# Patient Record
Sex: FEMALE | Race: WHITE
[De-identification: names, ages, dates, MRNs, and addresses within clinical notes are randomized per-mention and may not be internally consistent; named-entity substitution may affect disease eponyms.]

---

## 2018-10-16 ENCOUNTER — HOSPITAL ENCOUNTER (OUTPATIENT)
Dept: HOSPITAL 61 - PCVCIMAG | Age: 68
Discharge: HOME | End: 2018-10-16
Payer: MEDICARE

## 2018-10-16 ENCOUNTER — HOSPITAL ENCOUNTER (INPATIENT)
Dept: HOSPITAL 35 - 2N | Age: 68
LOS: 2 days | Discharge: HOME | DRG: 287 | End: 2018-10-18
Attending: INTERNAL MEDICINE | Admitting: INTERNAL MEDICINE
Payer: COMMERCIAL

## 2018-10-16 VITALS — DIASTOLIC BLOOD PRESSURE: 107 MMHG | SYSTOLIC BLOOD PRESSURE: 108 MMHG

## 2018-10-16 VITALS — DIASTOLIC BLOOD PRESSURE: 89 MMHG | SYSTOLIC BLOOD PRESSURE: 152 MMHG

## 2018-10-16 DIAGNOSIS — Z91.018: ICD-10-CM

## 2018-10-16 DIAGNOSIS — Z79.899: ICD-10-CM

## 2018-10-16 DIAGNOSIS — Z88.8: ICD-10-CM

## 2018-10-16 DIAGNOSIS — E78.5: ICD-10-CM

## 2018-10-16 DIAGNOSIS — I10: ICD-10-CM

## 2018-10-16 DIAGNOSIS — Z82.49: ICD-10-CM

## 2018-10-16 DIAGNOSIS — Z88.1: ICD-10-CM

## 2018-10-16 DIAGNOSIS — Z88.2: ICD-10-CM

## 2018-10-16 DIAGNOSIS — R94.31: Primary | ICD-10-CM

## 2018-10-16 DIAGNOSIS — M19.90: ICD-10-CM

## 2018-10-16 DIAGNOSIS — I25.5: ICD-10-CM

## 2018-10-16 DIAGNOSIS — I51.81: Primary | ICD-10-CM

## 2018-10-16 LAB
ANION GAP SERPL CALC-SCNC: 9 MMOL/L (ref 7–16)
CHLORIDE SERPL-SCNC: 106 MMOL/L (ref 98–107)
CO2 SERPL-SCNC: 27 MMOL/L (ref 21–32)
POTASSIUM SERPL-SCNC: 3.9 MMOL/L (ref 3.5–5.1)
SODIUM SERPL-SCNC: 142 MMOL/L (ref 136–145)

## 2018-10-16 PROCEDURE — 93308 TTE F-UP OR LMTD: CPT

## 2018-10-17 VITALS — SYSTOLIC BLOOD PRESSURE: 150 MMHG | DIASTOLIC BLOOD PRESSURE: 85 MMHG

## 2018-10-17 VITALS — SYSTOLIC BLOOD PRESSURE: 145 MMHG | DIASTOLIC BLOOD PRESSURE: 88 MMHG

## 2018-10-17 VITALS — SYSTOLIC BLOOD PRESSURE: 145 MMHG | DIASTOLIC BLOOD PRESSURE: 94 MMHG

## 2018-10-17 VITALS — DIASTOLIC BLOOD PRESSURE: 91 MMHG | SYSTOLIC BLOOD PRESSURE: 157 MMHG

## 2018-10-17 VITALS — DIASTOLIC BLOOD PRESSURE: 83 MMHG | SYSTOLIC BLOOD PRESSURE: 153 MMHG

## 2018-10-17 VITALS — SYSTOLIC BLOOD PRESSURE: 146 MMHG | DIASTOLIC BLOOD PRESSURE: 79 MMHG

## 2018-10-17 VITALS — SYSTOLIC BLOOD PRESSURE: 148 MMHG | DIASTOLIC BLOOD PRESSURE: 91 MMHG

## 2018-10-17 VITALS — DIASTOLIC BLOOD PRESSURE: 79 MMHG | SYSTOLIC BLOOD PRESSURE: 133 MMHG

## 2018-10-17 VITALS — SYSTOLIC BLOOD PRESSURE: 108 MMHG | DIASTOLIC BLOOD PRESSURE: 70 MMHG

## 2018-10-17 VITALS — DIASTOLIC BLOOD PRESSURE: 89 MMHG | SYSTOLIC BLOOD PRESSURE: 148 MMHG

## 2018-10-17 VITALS — SYSTOLIC BLOOD PRESSURE: 147 MMHG | DIASTOLIC BLOOD PRESSURE: 93 MMHG

## 2018-10-17 VITALS — SYSTOLIC BLOOD PRESSURE: 143 MMHG | DIASTOLIC BLOOD PRESSURE: 84 MMHG

## 2018-10-17 VITALS — DIASTOLIC BLOOD PRESSURE: 84 MMHG | SYSTOLIC BLOOD PRESSURE: 147 MMHG

## 2018-10-17 VITALS — DIASTOLIC BLOOD PRESSURE: 93 MMHG | SYSTOLIC BLOOD PRESSURE: 143 MMHG

## 2018-10-17 VITALS — DIASTOLIC BLOOD PRESSURE: 84 MMHG | SYSTOLIC BLOOD PRESSURE: 154 MMHG

## 2018-10-17 LAB
ANION GAP SERPL CALC-SCNC: 9 MMOL/L (ref 7–16)
BUN SERPL-MCNC: 15 MG/DL (ref 7–18)
CALCIUM SERPL-MCNC: 8.9 MG/DL (ref 8.5–10.1)
CHLORIDE SERPL-SCNC: 107 MMOL/L (ref 98–107)
CHOLEST SERPL-MCNC: 215 MG/DL (ref ?–200)
CO2 SERPL-SCNC: 26 MMOL/L (ref 21–32)
CREAT SERPL-MCNC: 0.7 MG/DL (ref 0.6–1)
ERYTHROCYTE [DISTWIDTH] IN BLOOD BY AUTOMATED COUNT: 14.1 % (ref 10.5–14.5)
GLUCOSE SERPL-MCNC: 110 MG/DL (ref 74–106)
HCT VFR BLD CALC: 40.6 % (ref 37–47)
HDLC SERPL-MCNC: 63 MG/DL (ref 40–?)
HGB BLD-MCNC: 13.7 GM/DL (ref 12–15)
LDLC SERPL-MCNC: 139 MG/DL (ref ?–100)
MCH RBC QN AUTO: 29.5 PG (ref 26–34)
MCHC RBC AUTO-ENTMCNC: 33.7 G/DL (ref 28–37)
MCV RBC: 87.4 FL (ref 80–100)
PLATELET # BLD: 173 THOU/UL (ref 150–400)
POTASSIUM SERPL-SCNC: 4 MMOL/L (ref 3.5–5.1)
RBC # BLD AUTO: 4.64 MIL/UL (ref 4.2–5)
SODIUM SERPL-SCNC: 142 MMOL/L (ref 136–145)
TC:HDL: 3.4 RATIO
TRIGL SERPL-MCNC: 68 MG/DL (ref ?–150)
VLDLC SERPL CALC-MCNC: 14 MG/DL (ref ?–40)
WBC # BLD AUTO: 6.7 THOU/UL (ref 4–11)

## 2018-10-17 PROCEDURE — B2111ZZ FLUOROSCOPY OF MULTIPLE CORONARY ARTERIES USING LOW OSMOLAR CONTRAST: ICD-10-PCS | Performed by: INTERNAL MEDICINE

## 2018-10-17 PROCEDURE — B2151ZZ FLUOROSCOPY OF LEFT HEART USING LOW OSMOLAR CONTRAST: ICD-10-PCS | Performed by: INTERNAL MEDICINE

## 2018-10-17 PROCEDURE — 4A023N7 MEASUREMENT OF CARDIAC SAMPLING AND PRESSURE, LEFT HEART, PERCUTANEOUS APPROACH: ICD-10-PCS | Performed by: INTERNAL MEDICINE

## 2018-10-17 NOTE — PC[facility]IMAG
--------------- APPROVED REPORT --------------





Study performed:  10/16/2018 16:17:00



EXAM: Limited 2D and color flow Echocardiogram

Patient Location: Echo lab

Room #:  2Status:  routine



BSA:         1.72

HR: 96 bpmBP:          132/80 mmHg

Rhythm: NSR



Other Information 

Study Quality: Good



Indications

Abnormal ECG 



Left Ventricle

Left ventricle is grossly normal size. Severe hypokinesis of the apex,

 distal septal and anterior walls is seen Left ventricular systolic 

function is mildly decreased. LVEF is 40-45%.



Aortic Valve

The aortic valve is normal in structure. No aortic regurgitation is 

present. There is no aortic valvular stenosis.



Mitral Valve

The mitral valve is normal in structure.



Tricuspid Valve

Tricuspid valve is grossly normal in structure and 

function.



Pericardium

No pericardial effusion. There is no pleural 

effusion.



&lt;Conclusion&gt;

Left ventricle is grossly normal size.

Left ventricular systolic function is mildly decreased.



Severe hypokinesis of the apex, distal septal and anterior walls is 

seen

The aortic valve is normal in structure.

No aortic regurgitation is present.

The mitral valve is normal in structure.

No pericardial effusion.

There is no pleural effusion.



Tricuspid valve is grossly normal in structure and function.

## 2018-10-18 VITALS — SYSTOLIC BLOOD PRESSURE: 112 MMHG | DIASTOLIC BLOOD PRESSURE: 67 MMHG

## 2018-10-18 VITALS — SYSTOLIC BLOOD PRESSURE: 137 MMHG | DIASTOLIC BLOOD PRESSURE: 84 MMHG

## 2018-10-18 VITALS — SYSTOLIC BLOOD PRESSURE: 131 MMHG | DIASTOLIC BLOOD PRESSURE: 87 MMHG

## 2018-10-18 VITALS — DIASTOLIC BLOOD PRESSURE: 87 MMHG | SYSTOLIC BLOOD PRESSURE: 131 MMHG

## 2019-04-23 ENCOUNTER — HOSPITAL ENCOUNTER (OUTPATIENT)
Dept: HOSPITAL 35 - CV | Age: 69
End: 2019-04-23
Attending: INTERNAL MEDICINE
Payer: COMMERCIAL

## 2019-04-23 DIAGNOSIS — I42.9: ICD-10-CM

## 2019-04-23 DIAGNOSIS — I07.1: Primary | ICD-10-CM

## 2019-04-23 DIAGNOSIS — Z88.2: ICD-10-CM

## 2019-04-23 DIAGNOSIS — Z88.0: ICD-10-CM

## 2019-04-23 DIAGNOSIS — Z88.1: ICD-10-CM
